# Patient Record
Sex: FEMALE | Race: WHITE | NOT HISPANIC OR LATINO | URBAN - METROPOLITAN AREA
[De-identification: names, ages, dates, MRNs, and addresses within clinical notes are randomized per-mention and may not be internally consistent; named-entity substitution may affect disease eponyms.]

---

## 2022-02-10 ENCOUNTER — TELEPHONE (OUTPATIENT)
Dept: GASTROENTEROLOGY | Facility: CLINIC | Age: 52
End: 2022-02-10

## 2022-02-10 NOTE — TELEPHONE ENCOUNTER
Returned patient's call however I had to leave a message for patient to please return my call - as

## 2022-11-11 VITALS — HEIGHT: 63 IN | BODY MASS INDEX: 32.96 KG/M2 | WEIGHT: 186 LBS

## 2022-11-11 DIAGNOSIS — Z80.0 FAMILY HISTORY OF COLON CANCER: Primary | ICD-10-CM

## 2022-11-11 NOTE — TELEPHONE ENCOUNTER
Returned call to patient and got chart updated and schedule. Pt doesn't have hx of cpap,heart stent or walking problem. Pt voiced understood instruction. CHUY

## 2022-11-14 RX ORDER — SOD SULF/POT CHLORIDE/MAG SULF 1.479 G
12 TABLET ORAL DAILY
Qty: 24 TABLET | Refills: 0 | Status: SHIPPED | OUTPATIENT
Start: 2022-11-14

## 2022-11-14 NOTE — TELEPHONE ENCOUNTER
[facility]  [address] [place] [address]  Phone: [phone]  Fax: [phone]    History & Physical         Provider: Dr. Héctor Lozano    Patient Name: Emilia MAXWELL (age):1970  52 y.o.           Gender: female   Phone: [phone]     Referring Physician: Charles Zavala     Vital Signs:   Height - 5'3  Weight - 186 lb   BMI -  32.95    Plan: Colonoscopy @ [facility]     Encounter Diagnosis   Name Primary?    Family history of colon cancer Yes           History:      Past Medical History:   Diagnosis Date    BMI 32.0-32.9,adult     Pre-diabetes       Past Surgical History:   Procedure Laterality Date    COLONOSCOPY  12/15/2016    mild diverticulosis    HYSTERECTOMY  2009    KNEE SURGERY Right 2011      Medication List with Changes/Refills   New Medications    SOD SULF-POT CHLORIDE-MAG SULF (SUTAB) 1.479-0.188- 0.225 GRAM TABLET    Take 12 tablets by mouth once daily. Take according to package instructions with indicated amount of water. No breakfast day before test. May substitute with Suprep, Clenpiq, Plenvu, Moviprep or GoLytely based on Rx plan and patient preference.      Review of patient's allergies indicates:  No Known Allergies   Family History   Problem Relation Age of Onset    Parkinsonism Father 72    Diabetes Sister     Diabetes Sister     Hypertension Sister     Thyroid disease Sister     Colon cancer Maternal Grandfather     Colon cancer Paternal Uncle     Prostate cancer Paternal Uncle       Social History     Tobacco Use    Smoking status: Former     Types: Cigarettes    Smokeless tobacco: Never   Substance Use Topics    Alcohol use: Yes     Alcohol/week: 1.0 standard drink     Types: 1 Glasses of wine per week        Physical Examination:     General Appearance:___________________________  HEENT:  _____________________________________  Abdomen:____________________________________  Heart:________________________________________  Lungs:_______________________________________  Extremities:___________________________________  Skin:_________________________________________  Endocrine:____________________________________  Genitourinary:_________________________________  Neurological:__________________________________      Patient has been evaluated immediately prior to sedation and is medically cleared for endoscopy with IVCS as an ASA class: ______      Physician Signature: _________________________       Date: ________  Time: ________

## 2023-01-06 ENCOUNTER — TELEPHONE (OUTPATIENT)
Dept: GASTROENTEROLOGY | Facility: CLINIC | Age: 53
End: 2023-01-06
Payer: COMMERCIAL

## 2023-01-06 NOTE — TELEPHONE ENCOUNTER
Called pharmacy claimed they did not have prescription, gave V.O. per RMM to fill medication Sutab.     Called patient with no answer left message that medication was at pharmacy.

## 2023-01-06 NOTE — TELEPHONE ENCOUNTER
----- Message from Ana Saini sent at 1/6/2023  9:37 AM CST -----  Type:  Needs Medical Advice    Who Called: Emilia Rahman,  Symptoms (please be specific): -   How long has patient had these symptoms:  -  Pharmacy name and phone #:    [facility] - [place], [place] - [address]  [place] [address]  Phone: [phone] Fax: [phone]      Would the patient rather a call back or a response via [facility]?    Best Call Back Number: [phone]    Additional Information: pt is at the pharmacy she needs the prep medication sod sulf-pot chloride-mag sulf (SUTAB) 1.479-0.188- 0.225 gram tablet called in her procedure is scheduled for 01/10/22

## 2023-01-06 NOTE — TELEPHONE ENCOUNTER
----- Message from Cori Romo LPN sent at 1/6/2023 11:32 AM CST -----    ----- Message -----  From: Ana Saini  Sent: 1/6/2023   9:39 AM CST  To: [facility] Staff    Type:  Needs Medical Advice    Who Called: Emilia Rahman,  Symptoms (please be specific): -   How long has patient had these symptoms:  -  Pharmacy name and phone #:    [facility] - [place], [place] - [address] [address]  Phone: [phone] Fax: [phone]      Would the patient rather a call back or a response via [facility]?    Best Call Back Number: [phone]    Additional Information: pt is at the pharmacy she needs the prep medication sod sulf-pot chloride-mag sulf (SUTAB) 1.479-0.188- 0.225 gram tablet called in her procedure is scheduled for 01/10/22

## 2023-01-06 NOTE — TELEPHONE ENCOUNTER
Sutab called in to [facility] as charted. Notified Ms Rahman that it was called in and she voiced understanding.

## 2023-01-10 ENCOUNTER — OUTSIDE PLACE OF SERVICE (OUTPATIENT)
Dept: GASTROENTEROLOGY | Facility: CLINIC | Age: 53
End: 2023-01-10

## 2023-01-10 LAB — CRC RECOMMENDATION EXT: NORMAL

## 2023-01-10 PROCEDURE — G0105 COLORECTAL SCRN; HI RISK IND: HCPCS | Mod: ,,, | Performed by: INTERNAL MEDICINE

## 2023-01-10 PROCEDURE — G0105 COLORECTAL SCRN; HI RISK IND: ICD-10-PCS | Mod: ,,, | Performed by: INTERNAL MEDICINE

## 2023-02-03 ENCOUNTER — DOCUMENTATION ONLY (OUTPATIENT)
Dept: GASTROENTEROLOGY | Facility: CLINIC | Age: 53
End: 2023-02-03
Payer: COMMERCIAL